# Patient Record
Sex: FEMALE | Race: WHITE | NOT HISPANIC OR LATINO | Employment: FULL TIME | URBAN - METROPOLITAN AREA
[De-identification: names, ages, dates, MRNs, and addresses within clinical notes are randomized per-mention and may not be internally consistent; named-entity substitution may affect disease eponyms.]

---

## 2017-05-17 ENCOUNTER — TELEPHONE (OUTPATIENT)
Dept: FAMILY MEDICINE | Facility: CLINIC | Age: 42
End: 2017-05-17

## 2017-05-17 DIAGNOSIS — Z80.8 FAMILY HISTORY OF THYROID CANCER: ICD-10-CM

## 2017-05-17 DIAGNOSIS — Z13.6 CARDIOVASCULAR SCREENING; LDL GOAL LESS THAN 160: Primary | ICD-10-CM

## 2017-05-17 DIAGNOSIS — F41.9 ANXIETY: ICD-10-CM

## 2017-05-17 DIAGNOSIS — F32.A MINOR DEPRESSION: ICD-10-CM

## 2017-05-17 DIAGNOSIS — Z00.01 ENCOUNTER FOR ROUTINE ADULT HEALTH EXAMINATION WITH ABNORMAL FINDINGS: ICD-10-CM

## 2017-05-19 DIAGNOSIS — Z13.6 CARDIOVASCULAR SCREENING; LDL GOAL LESS THAN 160: ICD-10-CM

## 2017-05-19 DIAGNOSIS — F32.A MINOR DEPRESSION: ICD-10-CM

## 2017-05-19 DIAGNOSIS — F41.9 ANXIETY: ICD-10-CM

## 2017-05-19 LAB
ERYTHROCYTE [DISTWIDTH] IN BLOOD BY AUTOMATED COUNT: 14.5 % (ref 10–15)
HCT VFR BLD AUTO: 41.2 % (ref 35–47)
HGB BLD-MCNC: 13.7 G/DL (ref 11.7–15.7)
MCH RBC QN AUTO: 27.7 PG (ref 26.5–33)
MCHC RBC AUTO-ENTMCNC: 33.3 G/DL (ref 31.5–36.5)
MCV RBC AUTO: 83 FL (ref 78–100)
PLATELET # BLD AUTO: 232 10E9/L (ref 150–450)
RBC # BLD AUTO: 4.94 10E12/L (ref 3.8–5.2)
WBC # BLD AUTO: 4.9 10E9/L (ref 4–11)

## 2017-05-19 PROCEDURE — 36415 COLL VENOUS BLD VENIPUNCTURE: CPT | Performed by: FAMILY MEDICINE

## 2017-05-19 PROCEDURE — 85027 COMPLETE CBC AUTOMATED: CPT | Performed by: FAMILY MEDICINE

## 2017-05-19 PROCEDURE — 84443 ASSAY THYROID STIM HORMONE: CPT | Performed by: FAMILY MEDICINE

## 2017-05-19 PROCEDURE — 80061 LIPID PANEL: CPT | Performed by: FAMILY MEDICINE

## 2017-05-19 PROCEDURE — 80053 COMPREHEN METABOLIC PANEL: CPT | Performed by: FAMILY MEDICINE

## 2017-05-20 LAB
ALBUMIN SERPL-MCNC: 4.4 G/DL (ref 3.4–5)
ALP SERPL-CCNC: 70 U/L (ref 40–150)
ALT SERPL W P-5'-P-CCNC: 21 U/L (ref 0–50)
ANION GAP SERPL CALCULATED.3IONS-SCNC: 8 MMOL/L (ref 3–14)
AST SERPL W P-5'-P-CCNC: 15 U/L (ref 0–45)
BILIRUB SERPL-MCNC: 0.4 MG/DL (ref 0.2–1.3)
BUN SERPL-MCNC: 14 MG/DL (ref 7–30)
CALCIUM SERPL-MCNC: 9 MG/DL (ref 8.5–10.1)
CHLORIDE SERPL-SCNC: 108 MMOL/L (ref 94–109)
CHOLEST SERPL-MCNC: 165 MG/DL
CO2 SERPL-SCNC: 24 MMOL/L (ref 20–32)
CREAT SERPL-MCNC: 0.9 MG/DL (ref 0.52–1.04)
GFR SERPL CREATININE-BSD FRML MDRD: 69 ML/MIN/1.7M2
GLUCOSE SERPL-MCNC: 83 MG/DL (ref 70–99)
HDLC SERPL-MCNC: 82 MG/DL
LDLC SERPL CALC-MCNC: 67 MG/DL
NONHDLC SERPL-MCNC: 83 MG/DL
POTASSIUM SERPL-SCNC: 4.1 MMOL/L (ref 3.4–5.3)
PROT SERPL-MCNC: 7.6 G/DL (ref 6.8–8.8)
SODIUM SERPL-SCNC: 140 MMOL/L (ref 133–144)
TRIGL SERPL-MCNC: 79 MG/DL
TSH SERPL DL<=0.005 MIU/L-ACNC: 1.88 MU/L (ref 0.4–4)

## 2017-06-23 ENCOUNTER — OFFICE VISIT (OUTPATIENT)
Dept: FAMILY MEDICINE | Facility: CLINIC | Age: 42
End: 2017-06-23
Payer: COMMERCIAL

## 2017-06-23 VITALS
DIASTOLIC BLOOD PRESSURE: 72 MMHG | WEIGHT: 129.6 LBS | TEMPERATURE: 97.9 F | OXYGEN SATURATION: 98 % | HEART RATE: 68 BPM | HEIGHT: 63 IN | BODY MASS INDEX: 22.96 KG/M2 | SYSTOLIC BLOOD PRESSURE: 122 MMHG

## 2017-06-23 DIAGNOSIS — F41.9 ANXIETY: ICD-10-CM

## 2017-06-23 DIAGNOSIS — Z00.01 ENCOUNTER FOR ROUTINE ADULT MEDICAL EXAM WITH ABNORMAL FINDINGS: Primary | ICD-10-CM

## 2017-06-23 DIAGNOSIS — F32.A MINOR DEPRESSION: ICD-10-CM

## 2017-06-23 DIAGNOSIS — F42.4 HABITUAL SELF-EXCORIATION: ICD-10-CM

## 2017-06-23 DIAGNOSIS — S54.02XA INJURY OF LEFT ULNAR NERVE, UNSPECIFIED INJURY LOCATION, INITIAL ENCOUNTER: ICD-10-CM

## 2017-06-23 DIAGNOSIS — M67.40 GANGLION CYST: ICD-10-CM

## 2017-06-23 PROCEDURE — 99396 PREV VISIT EST AGE 40-64: CPT | Performed by: FAMILY MEDICINE

## 2017-06-23 PROCEDURE — 99213 OFFICE O/P EST LOW 20 MIN: CPT | Mod: 25 | Performed by: FAMILY MEDICINE

## 2017-06-23 ASSESSMENT — ANXIETY QUESTIONNAIRES
6. BECOMING EASILY ANNOYED OR IRRITABLE: NOT AT ALL
5. BEING SO RESTLESS THAT IT IS HARD TO SIT STILL: NOT AT ALL
7. FEELING AFRAID AS IF SOMETHING AWFUL MIGHT HAPPEN: NOT AT ALL
1. FEELING NERVOUS, ANXIOUS, OR ON EDGE: NOT AT ALL
3. WORRYING TOO MUCH ABOUT DIFFERENT THINGS: NOT AT ALL
2. NOT BEING ABLE TO STOP OR CONTROL WORRYING: NOT AT ALL
GAD7 TOTAL SCORE: 0

## 2017-06-23 ASSESSMENT — PATIENT HEALTH QUESTIONNAIRE - PHQ9: 5. POOR APPETITE OR OVEREATING: NOT AT ALL

## 2017-06-23 NOTE — NURSING NOTE
"Chief Complaint   Patient presents with     Physical       Initial /72 (BP Location: Left arm, Patient Position: Chair, Cuff Size: Adult Regular)  Pulse 68  Temp 97.9  F (36.6  C) (Oral)  Ht 5' 3.25\" (1.607 m)  Wt 129 lb 9.6 oz (58.8 kg)  LMP 06/19/2017 (Exact Date)  SpO2 98%  BMI 22.78 kg/m2 Estimated body mass index is 22.78 kg/(m^2) as calculated from the following:    Height as of this encounter: 5' 3.25\" (1.607 m).    Weight as of this encounter: 129 lb 9.6 oz (58.8 kg).  Medication Reconciliation: complete   Nicolette Drake CMA  "

## 2017-06-23 NOTE — LETTER
[address] [address]  Phone: [phone]  Fax: [phone]  June 23, 2017     AUTHORIZATION TO RELEASE PROTECTED HEALTH INFORMATION    Patient Name:  Kira Gomez  YOB: 1975    Deepa MRN:[id number]             This will authorize [facility]  to request information from :     [facility] - Dr. Rossy Griffin     The following information is to be released for health maintenance and continuing care purposes with my primary care clinic:                 Mammogram                                         When: future - pt has upcoming appt     Pap Smear Report(most recent only)       When: future - pt has upcoming appt     -I understand that I may revoke this authorization by written request at any time to the address listed at the top of this form.  I understand that the revocation will not apply to information that has already been released in response to this authorization.    -This authorization last for one year after the date you sign it.     -[facility] cannot prevent redisclosure of the information by the person or organization who receives your records under this authorization, and that information may not be covered by state and federal privacy protections after it is released. By signing this authorization, you release [facility] from any and all liability resulting from a redisclosure by the recipient.    ___________________________________          _____________  Signature of Patient/Authorized Person                     Date        ____________________________________________  (Reason if patient is unable to sign)

## 2017-06-23 NOTE — PROGRESS NOTES
SUBJECTIVE:     CC: Kira Gomez is an 42 year old woman who presents for preventive health visit.     Answers for HPI/ROS submitted by the patient on 6/23/2017   Annual Exam:  Getting at least 3 servings of Calcium per day:: Yes  Bi-annual eye exam:: Yes  Dental care twice a year:: Yes  Sleep apnea or symptoms of sleep apnea:: None  Diet:: Regular (no restrictions)  Frequency of exercise:: 2-3 days/week  Taking medications regularly:: Yes  Medication side effects:: Not applicable  Additional concerns today:: YES  PHQ-2 Score: 0  Duration of exercise:: 45-60 minutes.       Depression and Anxiety Follow-Up    Status since last visit: No change    Other associated symptoms:None    Complicating factors:     Significant life event: No     Current substance abuse: None    PHQ-9 SCORE 12/18/2013 2/10/2016 6/23/2017   Total Score 21 - -   Total Score - 2 0     CAREN-7 SCORE 12/18/2013 2/10/2016 6/23/2017   Total Score 16 - -   Total Score - 14 0       PHQ-9  English  PHQ-9   Any Language  GAD7    Today's PHQ-2 Score:   PHQ-2 ( 1999 [employer]) 6/23/2017 2/10/2016   Q1: Little interest or pleasure in doing things 0 0   Q2: Feeling down, depressed or hopeless 0 0   PHQ-2 Score 0 0   Q1: Little interest or pleasure in doing things Not at all Not at all   Q2: Feeling down, depressed or hopeless Not at all Not at all   PHQ-2 Score 0 0       Abuse: Current or Past(Physical, Sexual or Emotional)- No  Do you feel safe in your environment - Yes    Social History   Substance Use Topics     Smoking status: Never Smoker     Smokeless tobacco: Never Used     Alcohol use 0.0 oz/week      Comment: occasional     The patient does not drink >3 drinks per day nor >7 drinks per week.    Recent Labs   Lab Test  05/19/17   1430 05/24/11   CHOL  165  157   HDL  82  67.4   LDL  67  67.8   TRIG  79  109   CHOLHDLRATIO   --   2.329   NHDL  83   --        Reviewed orders with patient.  Reviewed health maintenance and updated orders accordingly -  Yes    Mammo Decision Support:  Patient under age 50, mutual decision reflected in health maintenance.      Pertinent mammograms are reviewed under the imaging tab.  History of abnormal Pap smear: NO - age 30- 65 PAP every 3 years recommended    Reviewed and updated as needed this visit by clinical staff  Tobacco  Allergies  Meds  Problems  Med Hx  Surg Hx  Fam Hx  Soc Hx          Reviewed and updated as needed this visit by Provider  Allergies  Meds  Problems        Health Maintenance   Topic Date Due     MAMMO Q1 YR  2017     INFLUENZA VACCINE (SYSTEM ASSIGNED)  2017     PAP Q3 YR  2019     TETANUS IMMUNIZATION (SYSTEM ASSIGNED)  10/27/2021     LIPID MONITORING Q5 YEARS  2022       Patient Active Problem List   Diagnosis     CARDIOVASCULAR SCREENING; LDL GOAL LESS THAN 160     Other insomnia- has been off ambien for several years - noted 2/10/2016     Minor depression     Anxiety     Encounter for gynecological examination without abnormal finding- [facility]     Family history of thyroid cancer- father at age 67     Family history of bicuspid aortic valve- brother     OCD (obsessive compulsive disorder)- skin picking     Ganglion cyst     Injury of left ulnar nerve, unspecified injury location, initial encounter       Past Medical History:   Diagnosis Date     Family history of thyroid cancer- father at age 67      Migraines        Past Surgical History:   Procedure Laterality Date     ABDOMEN SURGERY  12/3/2007         C  DELIVERY+POSTPARTUM CARE         Social History     Social History     Marital status:      Spouse name: N/A     Number of children: N/A     Years of education: N/A     Occupational History     Not on file.     Social History Main Topics     Smoking status: Never Smoker     Smokeless tobacco: Never Used     Alcohol use 0.0 oz/week      Comment: occasional     Drug use: No     Sexual activity: Yes     Partners: Male     Birth control/  protection: Condom      Comment:  thinking about vasectomy      Other Topics Concern     Parent/Sibling W/ Cabg, Mi Or Angioplasty Before 65f 55m? No     Social History Narrative       Family History   Problem Relation Age of Onset     Prostate Cancer Maternal Grandfather      Eye Disorder Paternal Grandmother      Thyroid Disease Father        ROS: wondering if she has carpal tunnel syndrome - recently has noticed a little cyst on the dorsum of her left wrist - was scooping repetitively with left hand at [employer] for 2 hours , 2 months ago and since then the tip of her middle finger feels almost totally numb and gets intermittent numbness of the tips of her ring and pinkie fingers on the left as well.   Hx of what sounds like a ganglion cyst volar left wrist - bangs it with a heavy book every once in a while and it goes away - gone currently.     [facility] and annual gyn exams and gets her mammograms at [facility] in [place].     C: NEGATIVE for fever, chills, change in weight  I: NEGATIVE for worrisome rashes, moles or lesions  E: NEGATIVE for vision changes or irritation  ENT: NEGATIVE for ear, mouth and throat problems  R: NEGATIVE for significant cough or SOB  B: NEGATIVE for masses, tenderness or discharge  CV: NEGATIVE for chest pain, palpitations or peripheral edema  GI: NEGATIVE for nausea, abdominal pain, heartburn, or change in bowel habits  : NEGATIVE for unusual urinary or vaginal symptoms. Periods are regular.  M: NEGATIVE for significant arthralgias or myalgia  N: NEGATIVE for weakness, dizziness or paresthesias  P: NEGATIVE for changes in mood or affect    Problem list, Medication list, Allergies, and Medical/Social/Surgical histories reviewed in [facility] and updated as appropriate.  Labs reviewed in EPIC  BP Readings from Last 3 Encounters:   06/23/17 122/72   02/10/16 122/66   04/04/14 112/56    Wt Readings from Last 3 Encounters:   06/23/17 129 lb 9.6  "oz (58.8 kg)   02/10/16 128 lb (58.1 kg)   04/04/14 119 lb (54 kg)                  OBJECTIVE:     /72 (BP Location: Left arm, Patient Position: Chair, Cuff Size: Adult Regular)  Pulse 68  Temp 97.9  F (36.6  C) (Oral)  Ht 5' 3.25\" (1.607 m)  Wt 129 lb 9.6 oz (58.8 kg)  LMP 06/19/2017 (Exact Date)  SpO2 98%  BMI 22.78 kg/m2  EXAM:  GENERAL: healthy, alert and no distress.   EYES: Eyes grossly normal to inspection, PERRL and conjunctivae and sclerae normal  HENT: ear canals and TM's normal, nose and mouth without ulcers or lesions  NECK: no adenopathy, no asymmetry, masses, or scars and thyroid normal to palpation  RESP: lungs clear to auscultation - no rales, rhonchi or wheezes  CV: regular rate and rhythm, normal S1 S2, no S3 or S4, no murmur, click or rub, no peripheral edema and peripheral pulses strong.   ABDOMEN: soft, nontender, no hepatosplenomegaly, no masses and bowel sounds normal  MS: no gross musculoskeletal defects noted, no edema  SKIN: no suspicious lesions or rashes  NEURO: Normal strength and tone, mentation intact and speech normal- Cranial nerves 2-12 are fully intact.     Muscle strength is 5/5 at the biceps, triceps, brachioradialis,  strength and finger spread as well as hip flexors and extendors, quadriceps, hamstrings, foot dorsi- and plantar flexion as well as extensor hallucis longus bilaterally.   Reflexes are brisk and symmetric at the biceps, triceps, brachioradialis, patellar, and Achilles tendons bilaterally.  Toes are down going bilaterally.   Gait is normal.   PSYCH: mentation appears normal, affect normal/bright.   Tinel's sign - negative and Phalen's sign negative as well.    no ganglion cyst noted today.      Pt had her lab work done previously: discussed and released results to pt previously.   TSH   Date Value Ref Range Status   05/19/2017 1.88 0.40 - 4.00 mU/L Final     CBC RESULTS:   Recent Labs   Lab Test  05/19/17   1430   WBC  4.9   RBC  4.94   HGB  13.7 " "  HCT  41.2   MCV  83   MCH  27.7   MCHC  33.3   RDW  14.5   PLT  232     Last Basic Metabolic Panel:  Lab Results   Component Value Date     05/19/2017      Lab Results   Component Value Date    POTASSIUM 4.1 05/19/2017     Lab Results   Component Value Date    CHLORIDE 108 05/19/2017     Lab Results   Component Value Date    LISA 9.0 05/19/2017     Lab Results   Component Value Date    CO2 24 05/19/2017     Lab Results   Component Value Date    BUN 14 05/19/2017     Lab Results   Component Value Date    CR 0.90 05/19/2017     Lab Results   Component Value Date    GLC 83 05/19/2017     GFR Estimate   Date Value Ref Range Status   05/19/2017 69 >60 mL/min/1.7m2 Final     Comment:     Non  GFR Calc   12/08/2010 59 (L) >60 mL/min/1.7m2 Final   10/08/2008 78 >60 mL/min/1.7m2 Final     Recent Labs   Lab Test  05/19/17   1430 05/24/11   CHOL  165  157   HDL  82  67.4   LDL  67  67.8   TRIG  79  109   CHOLHDLRATIO   --   2.329            ASSESSMENT/PLAN:         ICD-10-CM    1. Encounter for routine adult medical exam with abnormal findings Z00.01    2. Injury of left ulnar nerve, unspecified injury location, initial encounter S54.02XA ORTHO  REFERRAL     OFFICE/OUTPT VISIT,EST,LEVL III   3. Ganglion cyst M67.40 OFFICE/OUTPT VISIT,EST,LEVL III   4. Habitual self-excoriation- under control currently  F42.4    5. Anxiety F41.9    6. Minor depression- in remission currently.  F32.9        COUNSELING:   Reviewed preventive health counseling, as reflected in patient instructions     reports that she has never smoked. She has never used smokeless tobacco.    Estimated body mass index is 22.78 kg/(m^2) as calculated from the following:    Height as of this encounter: 5' 3.25\" (1.607 m).    Weight as of this encounter: 129 lb 9.6 oz (58.8 kg).        Offered pt EMG and she'd like to try conservative therapy first.  Ok to order EMG , if pt changes her mind.     Spent  17 minutes on pt care today " outside of preventative care. All face to face time from 3:50 to 4:07.  Greater than 50% of time spent in coordination of care/counseling today re:  Injury of left ulnar nerve, unspecified injury location, initial encounter    Ganglion cyst - history of - recurrent - advised pt not to bang it with a book.     anxiety and depression - well controlled currently.     Counseling Resources:  ATP IV Guidelines  Pooled Cohorts Equation Calculator  Breast Cancer Risk Calculator  FRAX Risk Assessment  ICSI Preventive Guidelines  Dietary Guidelines for Americans, 2010  USDA's MyPlate  ASA Prophylaxis  Lung CA Screening    Mitzi Velasquez MD  [facility]

## 2017-06-23 NOTE — MR AVS SNAPSHOT
After Visit Summary   6/23/2017    Kira Gomez    MRN: [id number]           Patient Information     Date Of Birth          1975        Visit Information        Provider Department      6/23/2017 3:15 PM Mitzi Velasquez MD [facility] [place]        Today's Diagnoses     Encounter for routine adult medical exam with abnormal findings    -  1    Injury of left ulnar nerve, unspecified injury location, initial encounter        Ganglion cyst          Care Instructions    Try Ibuprofen 400-600mg (2-3 otc tablets) with food every 4-6 hours if needed for the numbness in your left hand to control inflammation. Try otc wrist brace and/or sling to keep arm and wrist stable at night.      Consider EMG testing to determine true cause  Of your numbness.        Preventive Health Recommendations  Female Ages 40 to 49    Yearly exam:     See your health care provider every year in order to  1. Review health changes.   2. Discuss preventive care.    3. Review your medicines if your doctor prescribed any.      Get a Pap test every three years (unless you have an abnormal result and your provider advises testing more often).      If you get Pap tests with HPV test, you only need to test every 5 years, unless you have an abnormal result. You do not need a Pap test if your uterus was removed (hysterectomy) and you have not had cancer.      You should be tested each year for STDs (sexually transmitted diseases), if you're at risk.       Ask your doctor if you should have a mammogram.      Have a colonoscopy (test for colon cancer) if someone in your family has had colon cancer or polyps before age 50.       Have a cholesterol test every 5 years.       Have a diabetes test (fasting glucose) after age 45. If you are at risk for diabetes, you should have this test every 3 years.    Shots: Get a flu shot each year. Get a tetanus shot every 10 years.     Nutrition:     Eat at least 5 servings of fruits  and vegetables each day.    Eat whole-grain bread, whole-wheat pasta and brown rice instead of white grains and rice.    Talk to your provider about Calcium and Vitamin D.     Lifestyle    Exercise at least 150 minutes a week (an average of 30 minutes a day, 5 days a week). This will help you control your weight and prevent disease.    Limit alcohol to one drink per day.    No smoking.     Wear sunscreen to prevent skin cancer.    See your dentist every six months for an exam and cleaning.                        Carpal Tunnel Syndrome               What is carpal tunnel syndrome?   Carpal tunnel syndrome is a common, painful disorder of the wrist and hand.   How does it occur?   Carpal tunnel syndrome is caused by pressure on the median nerve in your wrist. People who use their hands and wrists in the same motion over and over tend to develop carpal tunnel syndrome. It is common in cashiers, , assembly-line workers, and people who work on the computer.   Swelling from a broken bone or other injury can cause pressure on the nerve as well. Diseases such as arthritis, diabetes, or hypothyroidism can cause swelling and pressure in the wrist. Sometimes it happens during pregnancy.   What are the symptoms?   The symptoms include:   pain, numbness, or tingling in your hand and wrist, especially in the thumb and index and middle fingers; pain may radiate up into the forearm   increased pain with increased use of your hand, such as when you are driving or reading the newspaper   increased pain at night   weak  and tendency to drop objects held in the hand   sensitivity to cold   muscle deterioration especially in the thumb (in later stages)   How is it diagnosed?   Your healthcare provider will review your symptoms, examine you, and discuss the ways you use your hands. He or she may also do the following tests:   Your provider may tap the inside middle of your wrist over the median nerve. You may feel pain or a  sensation like an electric shock.   You may be asked to bend your wrist down for one minute to see if this causes symptoms.   Your provider may arrange to test the response of your nerves and muscles to electrical stimulation.   How is it treated?   If you have a disease that is causing carpal tunnel syndrome (such as rheumatoid arthritis), treating the disease may relieve your symptoms.   Other treatment focuses on relieving irritation and pressure on the nerve in your wrist. To relieve pressure your healthcare provider may suggest:   restricting use of your hand or changing the way you use it   changing the position of your desk, computer, and chair to one that irritates your wrist less   wearing a wrist splint   exercises   Your provider may prescribe a steroid medicine or a nonsteroidal anti-inflammatory medicine, such as ibuprofen. Nonsteroidal anti-inflammatory medicines (NSAIDs) may cause stomach bleeding and other problems. These risks increase with age. Read the label and take as directed. Unless recommended by your healthcare provider, do not take for more than 10 days.   Your provider may give you an injection of a corticosteroid medicine.   In some cases surgery may be necessary.   How long will the effects last?   How long the symptoms of carpal tunnel syndrome last depends on the cause and your response to treatment. Sometimes the symptoms go away without any treatment, or they may be relieved by nonsurgical treatment. Surgery may be needed to relieve the symptoms if they do not respond to treatment or they get worse. Surgery usually relieves the symptoms, especially if there is no permanent damage to the nerve.   Symptoms of carpal tunnel syndrome that occur during pregnancy usually disappear following delivery.   How can I take care of myself?   Follow your healthcare provider's recommendations. Also try the following:   Raise your arm on a pillow when you sit or lie down.   Avoid activities that  overuse your hand.   When you use a computer mouse, use it with the hand that does not have carpal tunnel syndrome.   Find a different way to use your hand by using another tool or try to use the other hand.   Avoid bending your wrists.   When can I return to my normal activities?   Everyone recovers from an injury at a different rate. Return to your activities depends on how soon your wrist recovers, not by how many days or weeks it has been since your injury has occurred. In general, the longer you have symptoms before you start treatment, the longer it will take to get better. The goal is to return to your normal activities as soon as is safely possible. If you return too soon you may worsen your injury.   You may return to your activities when you are able to painlessly  objects and have full range of motion and strength back in your wrist.   What can I do to help prevent carpal tunnel syndrome?   Make sure that your hands and wrists are supported, especially if you do repetitive work. Take regular breaks from the repetitive motion. Do not rest your wrists on hard or ridged surfaces for long periods of time.   In some cases the cause is not known and carpal tunnel syndrome cannot be prevented.            Carpal Tunnel Rehabilitation Exercise            You may do all of these exercises right away.   Wrist Range of Motion   0. Flexion: Gently bend your wrist forward. Hold for 5 seconds. Do 3 sets of 10.   0. Extension: Gently bend your wrist backward. Hold this position 5 seconds. Do 3 sets of 10.   0. Side to side: Gently move your wrist from side to side (a handshake motion). Hold for 5 seconds in each direction. Do 3 sets of 10.   Wrist stretch: Press the back of the hand on your injured side with your other hand to help bend your wrist. Hold for 15 to 30 seconds. Next, stretch the hand back by pressing the fingers in a backward direction. Hold for 15 to 30 seconds. Keep the arm on your injured side  straight during this exercise. Do 3 sets.   Mid-trap exercise: Lie on your stomach on a firm surface and place a folded pillow underneath your chest. Place your arms out straight to your sides with your elbows straight and thumbs toward the ceiling. Slowly raise your arms toward the ceiling as you squeeze your shoulder blades together. Lower slowly. Do 3 sets of 15. As the exercise gets easier to do, hold soup cans or small weights in your hands.   Pectoralis stretch:  a doorway or corner with both hands slightly above your head on the door frame or wall. Slowly lean forward until you feel a stretch in the front of your shoulders. Hold 15 to 30 seconds. Repeat 3 times.   Scalene stretch: Sit or stand and clasp both hands behind your back. Lower your left shoulder and tilt your head toward the right until you feel a stretch. Hold this position for 15 to 30 seconds and then come back to the starting position. Then lower your right shoulder and tilt your head toward the left. Hold for 15 to 30 seconds. Repeat 3 times on each side.   Thoracic extension: While sitting in a chair, clasp both arms behind your head. Gently arch backward and look up toward the ceiling. Repeat 10 times. Do this several times each day.   Scapular squeeze: While sitting or standing with your arms by your sides, squeeze your shoulder blades together and hold for 5 seconds. Do 3 sets of 10.   Wrist extension: Hold a soup can or hammer handle in your hand with your palm facing down. Slowly bend your wrist up. Slowly lower the weight down into the starting position. Do 3 sets of 10. Gradually increase the weight of the object you are holding.    strengthening: Squeeze a soft rubber ball and hold the squeeze for 5 seconds. Do 3 sets of 10.            Published by [employer].  This content is reviewed periodically and is subject to change as new health information becomes available. The information is intended to inform and educate and  is not a replacement for medical evaluation, advice, diagnosis or treatment by a healthcare professional.   Written by Rachelle Wallace, MS, PT, and Maggi Bojorquez PT, [facility], [facility], for [employer].   ? 2010 [employer] and/or its affiliates. All Rights Reserved.   Copyright   Clinical Reference Systems 2011    What Is Cubital Tunnel Syndrome?  Cubital tunnel syndrome is a set of symptoms that may occur if the ulnar nerve in your elbow gets pinched. This may happen if you bend or lean on your elbows often.    Your cubital tunnel  The cubital tunnel is a groove in a bone near your elbow. This narrow groove provides a passage for the ulnar nerve, one of the main nerves in your arm. The ulnar nerve can cause  funny bone  pain if your elbow gets bumped. Your cubital tunnel helps protect this nerve as it passes through your elbow and down to your fingers.  Compressing the ulnar nerve  Bending your elbow compresses the ulnar nerve inside the cubital tunnel. The nerve can get inflamed (irritated) after constant bending and pinching or after getting hurt. Over time, this can lead to pain or numbness. The pain is often felt in your ring fingers and little fingers.     Bending your elbow as you hold a phone can cause problems over time.    What are its symptoms?    Numbness or tingling in the ring fingers and little fingers    Loss of finger or hand strength    Inability to straighten fingers    Sharp, sudden pain when elbow is touched  The road to healing  You can keep cubital tunnel syndrome from flaring up. Avoid pinching the ulnar nerve by keeping your arm straight as much as you can, even while sleeping. And use phone headsets and elbow pads. If you still have pain, tell your doctor.   Date Last Reviewed: 9/8/2015 2000-2017 The [employer]. [address] [place] [address], [place] [address]. All rights reserved. This information is not intended as a substitute for professional medical care. Always follow your healthcare  "professional's instructions.        Ulnar Nerve Palsy  The ulnar nerve travels down the arm from the shoulder to the hand. It controls movement and feeling in the wrist and hand. Damage to this nerve can cause a condition called ulnar nerve palsy.  A common cause of ulnar nerve palsy is leaning on the elbow for long periods of time. Injury to the arm or elbow, such as a fracture, can also damage the ulnar nerve.  Symptoms of ulnar nerve palsy include:    Numbness, tingling, or burning (often described as \"pins and needles\")    Pain    Weakness in the hand and fingers  The treatment depends on the cause of the nerve damage. In some cases, the problem will go away on its once pressure to the nerve is relieved. Splinting the wrist to limit movement may help with healing. If the problem is due to trauma or injury, physical therapy may be prescribed. Corticosteroids injections into the area may reduce swelling and pressure on the nerve. Surgery to repair the nerve may be needed for chronic symptoms that do not respond to simpler treatments.  Home care    Rest the wrist and arm until normal feeling and strength return.    If you were given a splint or sling, wear it as directed.    Avoid positions leaning on your elbows.    If medicines were prescribed for pain or nerve sensations, take them as directed.  Follow-up care  Follow up with your healthcare provider or as advised by our staff.  When to seek medical advice  Call your healthcare provider right away if you have any of the following:    Increasing arm swelling or pain    Numbness or weakness of the arm    Symptoms spread to other parts of the body    Slurred speech, confusion    Trouble speaking, walking, or seeing  Date Last Reviewed: 9/25/2015 2000-2017 [employer]. [address] [place] [address]. All rights reserved. This information is not intended as a substitute for professional medical care. Always follow your healthcare " professional's instructions.                   Ganglion Cyst          What is a ganglion cyst?   A ganglion cyst is a swollen, closed sac under the skin. The sac is attached to the sheath of a tendon or may be attached to a joint. The cyst contains fluid similar to the fluid that is in your joints. It can vary in size from a small pea to a golf ball. Ganglion cysts most often occur on the wrist, at the end joint of a finger, or at the base of a finger. They may also occur on the foot.   How does it occur?   The cause of ganglion cysts is not known.   What are the symptoms?   You may feel discomfort or pain. Sometimes the area of the cyst becomes swollen or disfigured.   How is it diagnosed?   If there is any question about the diagnosis, your healthcare provider may stick a needle into the cyst to take a sample of the fluid inside it. The fluid can be tested for other problems, such as infection.   How is it treated?   Unless a cyst hurts, it does not need to be treated. If it does hurt, put an ice pack, gel pack, or package of frozen vegetables, wrapped in a cloth, on the area for up to 20 minutes at a time every 3 to 4 hours, or at least once daily, until it becomes less painful. Taking an anti-inflammatory drug, such as aspirin or ibuprofen, may also help. Check with your healthcare provider before you give any medicine that contains aspirin or salicylates to a child or teen. This includes medicines like baby aspirin, some cold medicines, and Pepto Bismol. Children and teens who take aspirin are at risk for a serious illness called Reye's syndrome. Nonsteroidal anti-inflammatory medicines (NSAIDs) may cause stomach bleeding and other problems. These risks increase with age. Read the label and take as directed. Unless recommended by your healthcare provider, do not take for more than 10 days for any reason.   The fluid can be removed with a needle, but the cysts tend to fill up again with fluid.   Do not try to  smash the cyst with a heavy object. Even if this home remedy succeeds at first, the cyst will almost always fill up again with fluid. In addition, you could seriously damage your wrist or finger.   If a ganglion cyst is painful, limits activity, or is unsightly, it can be surgically removed. Surgery to remove the cyst requires making a small cut through the skin. The cut usually heals quickly and leaves a small scar.   How long will the effects last?   Sometimes cysts eventually go away whether they are treated or not. If your cyst is painful or interferes with your activities, you may need to have surgery. Even with surgical treatment, a cyst may come back.   How can I take care of myself?   Follow the treatment recommended by your healthcare provider.   How can I help prevent ganglion cysts?   There is no known way to prevent these cysts because their cause is not known.     Published by [employer].  This content is reviewed periodically and is subject to change as new health information becomes available. The information is intended to inform and educate and is not a replacement for medical evaluation, advice, diagnosis or treatment by a healthcare professional.   Developed by [employer].   ? 2010 [employer] and/or its affiliates. All Rights Reserved.   Copyright   Clinical Reference Systems 2011                      Follow-ups after your visit        Additional Services     ORTHO  REFERRAL       [facility] is referring you to the Orthopedic  Services at [facility].       The  Representative will assist you in the coordination of your Orthopedic and Musculoskeletal Care as prescribed by your physician.    The  Representative will call you within 1 business day to help schedule your appointment, or you may contact the  Representative at:    All areas ~ [phone]     Type of Referral : Non Surgical       Timeframe requested:  "Within 2 weeks    Coverage of these services is subject to the terms and limitations of your health insurance plan.  Please call member services at your health plan with any benefit or coverage questions.      If X-rays, CT or MRI's have been performed, please contact the facility where they were done to arrange for , prior to your scheduled appointment.  Please bring this referral request to your appointment and present it to your specialist.                  Who to contact     If you have questions or need follow up information about today's clinic visit or your schedule please contact [facility] directly at [phone].  Normal or non-critical lab and imaging results will be communicated to you by [email or web address], letter or phone within 4 business days after the clinic has received the results. If you do not hear from us within 7 days, please contact the clinic through [employer] or phone. If you have a critical or abnormal lab result, we will notify you by phone as soon as possible.  Submit refill requests through [email or web address] or call your pharmacy and they will forward the refill request to us. Please allow 3 business days for your refill to be completed.          Additional Information About Your Visit        [email or web address] Information     [email or web address] gives you secure access to your electronic health record. If you see a primary care provider, you can also send messages to your care team and make appointments. If you have questions, please call your primary care clinic.  If you do not have a primary care provider, please call [phone] and they will assist you.        Care EveryWhere ID     This is your Care EveryWhere ID. This could be used by other organizations to access your [facility] medical records  [id number]        Your Vitals Were     Pulse Temperature Height Last Period Pulse Oximetry BMI (Body Mass Index)    68 97.9  F (36.6  C) (Oral) 5' 3.25\" (1.607 m) 06/19/2017 (Exact Date) 98% 22.78 kg/m2    "    Blood Pressure from Last 3 Encounters:   06/23/17 122/72   02/10/16 122/66   04/04/14 112/56    Weight from Last 3 Encounters:   06/23/17 129 lb 9.6 oz (58.8 kg)   02/10/16 128 lb (58.1 kg)   04/04/14 119 lb (54 kg)              We Performed the Following     ORTHO  REFERRAL        Primary Care Provider Office Phone # Fax #    Mitzi Velasquez [phone]       [facility] [address] [facility] [address]        Equal Access to Services     [facility]: Hadii aad ku hadasho Soomaali, waaxda luqadaha, qaybta kaalmada adeegyada, mathew johnson . So [facility] [phone].    ATENCIÓN: Si habla español, tiene a reagan disposición servicios gratuitos de asistencia lingüística. [facility] [phone].    We comply with applicable federal civil rights laws and [place] laws. We do not discriminate on the basis of race, color, national origin, age, disability sex, sexual orientation or gender identity.            Thank you!     Thank you for choosing [facility]  for your care. Our goal is always to provide you with excellent care. Hearing back from our patients is one way we can continue to improve our services. Please take a few minutes to complete the written survey that you may receive in the mail after your visit with us. Thank you!             Your Updated Medication List - Protect others around you: Learn how to safely use, store and throw away your medicines at www.disposemymeds.org.          This list is accurate as of: 6/23/17  4:24 PM.  Always use your most recent med list.                   Brand Name Dispense Instructions for use Diagnosis    FIBER PO      Take by mouth every other day

## 2017-06-26 PROBLEM — F42.4 HABITUAL SELF-EXCORIATION: Status: ACTIVE | Noted: 2017-06-26

## 2017-06-27 ENCOUNTER — OFFICE VISIT (OUTPATIENT)
Dept: ORTHOPEDICS | Facility: CLINIC | Age: 42
End: 2017-06-27
Payer: COMMERCIAL

## 2017-06-27 VITALS
HEIGHT: 63 IN | BODY MASS INDEX: 22.86 KG/M2 | DIASTOLIC BLOOD PRESSURE: 78 MMHG | WEIGHT: 129 LBS | SYSTOLIC BLOOD PRESSURE: 120 MMHG

## 2017-06-27 DIAGNOSIS — G56.02 CARPAL TUNNEL SYNDROME OF LEFT WRIST: Primary | ICD-10-CM

## 2017-06-27 PROCEDURE — 99243 OFF/OP CNSLTJ NEW/EST LOW 30: CPT | Performed by: FAMILY MEDICINE

## 2017-06-27 ASSESSMENT — PATIENT HEALTH QUESTIONNAIRE - PHQ9: SUM OF ALL RESPONSES TO PHQ QUESTIONS 1-9: 0

## 2017-06-27 ASSESSMENT — ANXIETY QUESTIONNAIRES: GAD7 TOTAL SCORE: 0

## 2017-06-27 NOTE — PATIENT INSTRUCTIONS
Thank you for allowing us to participate in your care today.  Please find below your visit diagnosis and the plan going forward.    1. Carpal tunnel syndrome of left wrist      Discussed exam  Hand therapy: [facility] - [phone]    Follow up after 3-4 visits of therapy if not improving or sooner if needed. Call direct clinic number [[phone]] at any time with questions or concerns.    Rishi Robertson DO CAQSM  [facility]  Website: [email or web address]  Twitter: [email or web address]

## 2017-06-27 NOTE — Clinical Note
Kira Mitchell Dr. saw me at [facility] on Jun 27, 2017.  Please refer to the visit note at your convenience and feel free to contact me should you have any questions.  Thank you for the consult. Sincerely,  Rishi Robertson DO, [facility]

## 2017-06-27 NOTE — MR AVS SNAPSHOT
After Visit Summary   6/27/2017    Kira Gomez    MRN: [id number]           Patient Information     Date Of Birth          1975        Visit Information        Provider Department      6/27/2017 4:40 PM Rishi Robertson DO [facility]IC[facility]        Today's Diagnoses     Carpal tunnel syndrome of left wrist    -  1      Care Instructions    Thank you for allowing us to participate in your care today.  Please find below your visit diagnosis and the plan going forward.    1. Carpal tunnel syndrome of left wrist      Discussed exam  Hand therapy: [facility] - [phone]    Follow up after 3-4 visits of therapy if not improving or sooner if needed. Call direct clinic number [[phone]] at any time with questions or concerns.    Rishi Robertson DO [facility]  Website: [email or web address]  Twitter: [email or web address]            Follow-ups after your visit        Additional Services     [facility] PT, HAND, AND CHIROPRACTIC REFERRAL       **This order will print in the [facility] Scheduling Office**    Physical Therapy, Hand Therapy and Chiropractic Care are available through:    *[facility]  *[facility]  *[facility]    Call one number to schedule at any of the above locations: [phone].    Your provider has referred you to: Hand Therapy    Indication/Reason for Referral: Left Middle Finger Numbness (incomplete carpal tunnel)  Onset of Illness: see chart  Therapy Orders: Evaluate and Treat  Special Programs: None  Special Request: custom orthosis if indicated    Andrez Maguire      Additional Comments for the Therapist or Chiropractor:     Please be aware that coverage of these services is subject to the terms and limitations of your health insurance plan.  Call member services at your health plan with any benefit or coverage questions.      Please bring the following to your  "appointment:    *Your personal calendar for scheduling future appointments  *Comfortable clothing                  Who to contact     If you have questions or need follow up information about today's clinic visit or your schedule please contact [facility] SPORTS MEDICINE directly at [phone].  Normal or non-critical lab and imaging results will be communicated to you by Vividolabshart, letter or phone within 4 business days after the clinic has received the results. If you do not hear from us within 7 days, please contact the clinic through Vividolabshart or phone. If you have a critical or abnormal lab result, we will notify you by phone as soon as possible.  Submit refill requests through Startapp or call your pharmacy and they will forward the refill request to us. Please allow 3 business days for your refill to be completed.          Additional Information About Your Visit        Startapp Information     Startapp gives you secure access to your electronic health record. If you see a primary care provider, you can also send messages to your care team and make appointments. If you have questions, please call your primary care clinic.  If you do not have a primary care provider, please call [phone] and they will assist you.        Care EveryWhere ID     This is your Care EveryWhere ID. This could be used by other organizations to access your [place] medical records  [id number]        Your Vitals Were     Height Last Period BMI (Body Mass Index)             5' 3\" (1.6 m) 06/19/2017 (Exact Date) 22.85 kg/m2          Blood Pressure from Last 3 Encounters:   06/27/17 120/78   06/23/17 122/72   02/10/16 122/66    Weight from Last 3 Encounters:   06/27/17 129 lb (58.5 kg)   06/23/17 129 lb 9.6 oz (58.8 kg)   02/10/16 128 lb (58.1 kg)              We Performed the Following     [facility] PT, HAND, AND CHIROPRACTIC REFERRAL        Primary Care Provider Office Phone # Fax #    Mitzi Velasquez [phone] "       [facility] [address] [address]        Equal Access to Services     JORGESAMANTHA ADELINA : Hadii aad ku hadcrowmark Socriselda, waaxda luqadaha, qaybta kaalmada kenyattakavonandrés, waxay idiin hayadolfooriana benitezboubacarnorm morales. So [facility] [phone].    ATENCIÓN: Si habla español, tiene a reagan disposición servicios gratuitos de asistencia lingüística. Llame al [phone].    We comply with applicable federal civil rights laws and [place] laws. We do not discriminate on the basis of race, color, national origin, age, disability sex, sexual orientation or gender identity.            Thank you!     Thank you for choosing [facility] SP[facility]  for your care. Our goal is always to provide you with excellent care. Hearing back from our patients is one way we can continue to improve our services. Please take a few minutes to complete the written survey that you may receive in the mail after your visit with us. Thank you!             Your Updated Medication List - Protect others around you: Learn how to safely use, store and throw away your medicines at www.disposemymeds.org.          This list is accurate as of: 6/27/17  5:01 PM.  Always use your most recent med list.                   Brand Name Dispense Instructions for use Diagnosis    FIBER PO      Take by mouth every other day

## 2017-06-27 NOTE — PROGRESS NOTES
"ASSESSMENT & PLAN    ICD-10-CM    1. Carpal tunnel syndrome of left wrist G56.02 [facility], HAND, AND CHIROPRACTIC REFERRAL   Incomplete medial nerve neuropathy maybe related to transient flexor tenosynovitis/irritation  Hand therapy: [facility] - [phone]    Follow up after 3-4 visits of therapy if not improving or sooner if needed. Call direct clinic number [[phone]] at any time with questions or concerns. Instructed to call the office if the condition evolves or worsens.    -----    SUBJECTIVE  Kira Gomez is a/an 42 year old right hand dominant female who is seen in consultation at the request of Dr. Velasquez for evaluation of left distal middle finger numbness/pins and needles pain. The patient is seen by themselves.    Onset: 2 month(s) ago. Patient describes injury as possibly from serving food for over an hour. Had a tight  with repetitive pronation/supination movement. Remembers having difficulty / tightness when trying to release her .  Worsened by: unknown  Better with: unknown  Quality: pins and needles and numbness in middle finger  Pain Scale (maximum/current)/10: 0/10 / 0/10  Treatments tried: ibuprofen  Orthopedic history: has a history of ganglion cyst on dorsal wrist  Relevant surgical history: NO  Patient Social History: works at [employer]    Patient's past medical, surgical, social, and family histories were reviewed today and no changes are noted.    REVIEW OF SYSTEMS:  10 point ROS is negative other than symptoms noted above in HPI, Past Medical History or as stated below  Constitutional: NEGATIVE for fever, chills, change in weight  Skin: NEGATIVE for worrisome rashes, moles or lesions  GI/: NEGATIVE for bowel or bladder changes  Neuro: NEGATIVE for weakness, dizziness or paresthesias    OBJECTIVE:  /78  Ht 5' 3\" (1.6 m)  Wt 129 lb (58.5 kg)  LMP 06/19/2017 (Exact Date)  BMI 22.85 kg/m2   General: healthy, alert and in no distress  HEENT: no " scleral icterus or conjunctival erythema  Skin: no suspicious lesions or rash. No jaundice.  CV: regular rhythm by palpation  Resp: normal respiratory effort without conversational dyspnea   Psych: normal mood and affect  Gait: normal steady gait with appropriate coordination and balance  Neuro: decreased sensation over left middle finger, DIP and distal otherwise normal light touch sensory exam of the bilateral hands.     MSK:  LEFT HAND  Inspection:    No swelling or obvious deformity or asymmetry  Palpation:  Nontender over digits and carpal bones  Range of Motion:    Full in all planes  Strength:     full, Full with resisted flexor / extensor testing. Neither provokes/worsens numbness/tingling  Special Tests:    Negative: Tinel's, Phalen's    Independent visualization of the below image:  None indicated at this time    Patient's conditions were thoroughly discussed during today's visit with greater than 50% of the visit spent counseling the patient with total time spent face-to-face with the patient being 20 minutes.    Rishi Robertson DO [facility] Orthopedic Care

## 2017-07-01 ENCOUNTER — HEALTH MAINTENANCE LETTER (OUTPATIENT)
Age: 42
End: 2017-07-01

## 2017-07-10 ENCOUNTER — MYC MEDICAL ADVICE (OUTPATIENT)
Dept: FAMILY MEDICINE | Facility: CLINIC | Age: 42
End: 2017-07-10

## 2017-08-04 NOTE — TELEPHONE ENCOUNTER
JAYCE.    See mychart note. Do you want me to ask Priscilla or Izabella to follow up?    Dania Cadena, CATINA, RN, PHN  [facility]) [phone]

## 2017-08-04 NOTE — TELEPHONE ENCOUNTER
I reviewed pt's visit and we discussed her numbness with ulnar type nerve issues in detail  And I warned that she might be charged for an office visit as we did discuss things outside the scope of preventative care.   Absolutely Izabella can call the patient, but she was aware of the above possibility.     Routed to Izabella.  Triage, will you please ensure Izabella sees this? Thanks. ---KINSEY

## 2017-08-07 NOTE — TELEPHONE ENCOUNTER
Note printed and placed on Izabella's desk.     Ro Romero RN  [facility]

## 2017-08-29 ENCOUNTER — MYC MEDICAL ADVICE (OUTPATIENT)
Dept: FAMILY MEDICINE | Facility: CLINIC | Age: 42
End: 2017-08-29

## 2017-08-30 ENCOUNTER — MYC MEDICAL ADVICE (OUTPATIENT)
Dept: FAMILY MEDICINE | Facility: CLINIC | Age: 42
End: 2017-08-30

## 2018-07-07 ENCOUNTER — HEALTH MAINTENANCE LETTER (OUTPATIENT)
Age: 43
End: 2018-07-07

## 2018-11-14 ENCOUNTER — OFFICE VISIT (OUTPATIENT)
Dept: URGENT CARE | Facility: URGENT CARE | Age: 43
End: 2018-11-14
Payer: COMMERCIAL

## 2018-11-14 ENCOUNTER — RADIANT APPOINTMENT (OUTPATIENT)
Dept: GENERAL RADIOLOGY | Facility: CLINIC | Age: 43
End: 2018-11-14
Attending: FAMILY MEDICINE
Payer: COMMERCIAL

## 2018-11-14 VITALS
SYSTOLIC BLOOD PRESSURE: 138 MMHG | HEART RATE: 81 BPM | WEIGHT: 135.5 LBS | TEMPERATURE: 98.8 F | DIASTOLIC BLOOD PRESSURE: 82 MMHG | BODY MASS INDEX: 24 KG/M2 | OXYGEN SATURATION: 98 %

## 2018-11-14 DIAGNOSIS — R42 DIZZINESS: Primary | ICD-10-CM

## 2018-11-14 DIAGNOSIS — R42 DIZZINESS: ICD-10-CM

## 2018-11-14 LAB
ALBUMIN UR-MCNC: NEGATIVE MG/DL
APPEARANCE UR: CLEAR
BILIRUB UR QL STRIP: NEGATIVE
COLOR UR AUTO: YELLOW
GLUCOSE UR STRIP-MCNC: NEGATIVE MG/DL
HGB UR QL STRIP: NEGATIVE
KETONES UR STRIP-MCNC: NEGATIVE MG/DL
LEUKOCYTE ESTERASE UR QL STRIP: NEGATIVE
NITRATE UR QL: NEGATIVE
PH UR STRIP: 7 PH (ref 5–7)
SOURCE: NORMAL
SP GR UR STRIP: 1.02 (ref 1–1.03)
UROBILINOGEN UR STRIP-ACNC: 0.2 EU/DL (ref 0.2–1)

## 2018-11-14 PROCEDURE — 93000 ELECTROCARDIOGRAM COMPLETE: CPT | Performed by: FAMILY MEDICINE

## 2018-11-14 PROCEDURE — 36415 COLL VENOUS BLD VENIPUNCTURE: CPT | Performed by: FAMILY MEDICINE

## 2018-11-14 PROCEDURE — 80053 COMPREHEN METABOLIC PANEL: CPT | Performed by: FAMILY MEDICINE

## 2018-11-14 PROCEDURE — 81003 URINALYSIS AUTO W/O SCOPE: CPT | Performed by: FAMILY MEDICINE

## 2018-11-14 PROCEDURE — 71046 X-RAY EXAM CHEST 2 VIEWS: CPT

## 2018-11-14 PROCEDURE — 99214 OFFICE O/P EST MOD 30 MIN: CPT | Performed by: FAMILY MEDICINE

## 2018-11-15 LAB
ALBUMIN SERPL-MCNC: 4.2 G/DL (ref 3.4–5)
ALP SERPL-CCNC: 53 U/L (ref 40–150)
ALT SERPL W P-5'-P-CCNC: 19 U/L (ref 0–50)
ANION GAP SERPL CALCULATED.3IONS-SCNC: 7 MMOL/L (ref 3–14)
AST SERPL W P-5'-P-CCNC: 20 U/L (ref 0–45)
BILIRUB SERPL-MCNC: 0.3 MG/DL (ref 0.2–1.3)
BUN SERPL-MCNC: 18 MG/DL (ref 7–30)
CALCIUM SERPL-MCNC: 8.7 MG/DL (ref 8.5–10.1)
CHLORIDE SERPL-SCNC: 105 MMOL/L (ref 94–109)
CO2 SERPL-SCNC: 27 MMOL/L (ref 20–32)
CREAT SERPL-MCNC: 0.85 MG/DL (ref 0.52–1.04)
GFR SERPL CREATININE-BSD FRML MDRD: 73 ML/MIN/1.7M2
GLUCOSE SERPL-MCNC: 87 MG/DL (ref 70–99)
POTASSIUM SERPL-SCNC: 3.9 MMOL/L (ref 3.4–5.3)
PROT SERPL-MCNC: 7.2 G/DL (ref 6.8–8.8)
SODIUM SERPL-SCNC: 139 MMOL/L (ref 133–144)

## 2018-11-15 NOTE — PROGRESS NOTES
SUBJECTIVE:   Kira Gomez is a 43 year old female presenting with a chief complaint of   Chief Complaint   Patient presents with     Dizziness     Dizzy and light head last few days and has had chest pain. Issue breathing when this occurs, chest pains becoming more frequent. Has also had a headache.  Tried alive no relief  Today BP was higher than normal, finger tips feels like they are tingling 141/65 bp yesterday earlier 136/90     Does think there is a possibility she was having some anxiety with this.    There is a family history of high blood pressure    She has a personal history of anxiety she has a personal history of anxiety  She did not sleep well last night thinking about this  She is an established patient of [facility].        Review of Systems    Past Medical History:   Diagnosis Date     Family history of thyroid cancer- father at age 67      Migraines      Family History   Problem Relation Age of Onset     Prostate Cancer Maternal Grandfather      Eye Disorder Paternal Grandmother      Thyroid Disease Father      Current Outpatient Prescriptions   Medication Sig Dispense Refill     FIBER PO Take by mouth every other day       Social History   Substance Use Topics     Smoking status: Never Smoker     Smokeless tobacco: Never Used     Alcohol use 0.0 oz/week      Comment: occasional       OBJECTIVE  /82 (BP Location: Right arm, Patient Position: Chair, Cuff Size: Adult Regular)  Pulse 81  Temp 98.8  F (37.1  C) (Oral)  Wt 135 lb 8 oz (61.5 kg)  SpO2 98%  BMI 24 kg/m2    Physical Exam    Labs:  Results for orders placed or performed in visit on 11/14/18 (from the past 24 hour(s))   *UA reflex to Microscopic and Culture ([facility] (except [place] and [place])   Result Value Ref [facility]    Color Urine Yellow     Appearance Urine Clear     Glucose Urine Negative NEG^Negative mg/dL    Bilirubin Urine Negative NEG^Negative    Ketones Urine Negative NEG^Negative mg/dL     Specific Gravity Urine 1.020 1.003 - 1.035    Blood Urine Negative NEG^Negative    pH Urine 7.0 5.0 - 7.0 pH    Protein Albumin Urine Negative NEG^Negative mg/dL    Urobilinogen Urine 0.2 0.2 - 1.0 EU/dL    Nitrite Urine Negative NEG^Negative    Leukocyte Esterase Urine Negative NEG^Negative    Source Midstream Urine        X-Ray was done, my findings are: No abnormal findings    ASSESSMENT:      ICD-10-CM    1. Dizziness R42 Comprehensive metabolic panel     EKG 12-lead complete w/read - Clinics     XR Chest 2 Views     *UA reflex to Microscopic and Culture ([facility] (except [place] and [place])    2.  Elevated blood pressure  3.  Anxiety; self described    Discussed findings today I suspect that this is probably more due to anxiety.    Her story is consistent with she did not sleep very well because she was thinking about this particular problem and wondering about her heart running about pulmonary embolism.  In addition she knows that there is a family history of high blood pressure    .   Her brother is known to have a bicuspid valve she is had an EKG and an echocardiogram that were normal.  Medical Decision Making:    Differential Diagnosis:  Hypertension, musculoskeletal chest pain, pleurisy , pericarditis, esophageal spasm    Serious Comorbid Conditions:  Adult:  None    PLAN:  1. She will use Antivert OTC for the dizziness that is probably middle ear viral infection.      Followup:    I do think she needs to follow-up with her primary care.  There are still some labs that are pending and this can be discussed    However I did not seeming to find any evidence of endorgan damage at this time her chest x-ray looked good her cardiac silhouette looked good her EKG was within normal limits without any evidence that suggested any previous MI or abnormality or ischemia.    Her urine showed no protein  Other labs are pending      There are no Patient Instructions on file for this visit.

## 2019-01-08 ENCOUNTER — TRANSFERRED RECORDS (OUTPATIENT)
Dept: HEALTH INFORMATION MANAGEMENT | Facility: CLINIC | Age: 44
End: 2019-01-08
Payer: COMMERCIAL

## 2019-01-08 LAB
HPV ABSTRACT: NORMAL
PAP-ABSTRACT: NORMAL

## 2019-12-16 ENCOUNTER — HEALTH MAINTENANCE LETTER (OUTPATIENT)
Age: 44
End: 2019-12-16

## 2020-10-05 ENCOUNTER — TRANSFERRED RECORDS (OUTPATIENT)
Dept: HEALTH INFORMATION MANAGEMENT | Facility: CLINIC | Age: 45
End: 2020-10-05
Payer: COMMERCIAL

## 2021-01-13 ENCOUNTER — OFFICE VISIT (OUTPATIENT)
Dept: FAMILY MEDICINE | Facility: CLINIC | Age: 46
End: 2021-01-13
Payer: COMMERCIAL

## 2021-01-13 VITALS
HEART RATE: 111 BPM | DIASTOLIC BLOOD PRESSURE: 65 MMHG | TEMPERATURE: 98.2 F | OXYGEN SATURATION: 100 % | HEIGHT: 63 IN | WEIGHT: 133 LBS | BODY MASS INDEX: 23.57 KG/M2 | SYSTOLIC BLOOD PRESSURE: 122 MMHG

## 2021-01-13 DIAGNOSIS — Z01.818 PREOP GENERAL PHYSICAL EXAM: Primary | ICD-10-CM

## 2021-01-13 DIAGNOSIS — L98.7 LOOSE SKIN: ICD-10-CM

## 2021-01-13 LAB
HCG UR QL: NEGATIVE
HGB BLD-MCNC: 13.4 G/DL (ref 11.7–15.7)

## 2021-01-13 PROCEDURE — 81025 URINE PREGNANCY TEST: CPT | Performed by: NURSE PRACTITIONER

## 2021-01-13 PROCEDURE — 85018 HEMOGLOBIN: CPT | Performed by: NURSE PRACTITIONER

## 2021-01-13 PROCEDURE — 99214 OFFICE O/P EST MOD 30 MIN: CPT | Performed by: NURSE PRACTITIONER

## 2021-01-13 PROCEDURE — 36415 COLL VENOUS BLD VENIPUNCTURE: CPT | Performed by: NURSE PRACTITIONER

## 2021-01-13 ASSESSMENT — MIFFLIN-ST. JEOR: SCORE: 1216.37

## 2021-01-13 NOTE — PROGRESS NOTES
[address] [address]  Phone: [phone]  Primary Provider: Mitzi Velasquez  Pre-op Performing Provider: KECIA LAI    PREOPERATIVE EVALUATION:  Today's date: 1/13/2021    Kira Gomez is a 45 year old female who presents for a preoperative evaluation.    Surgical Information:  Surgery/Procedure: Neck Lift  Surgery Location: [facility]  Surgeon: Dr Ramirez  Surgery Date: 01/25/2020  Time of Surgery: 9:30am  Where patient plans to recover: At home with family  Fax number for surgical facility: [phone]    Type of Anesthesia Anticipated: General    Subjective     HPI related to upcoming procedure: neck lift for loose skin.      Preop Questions 1/13/2021   1. Have you ever had a heart attack or stroke? No   2. Have you ever had surgery on your heart or blood vessels, such as a stent placement, a coronary artery bypass, or surgery on an artery in your head, neck, heart, or legs? No   3. Do you have chest pain with activity? No   4. Do you have a history of  heart failure? No   5. Do you currently have a cold, bronchitis or symptoms of other infection? No   6. Do you have a cough, shortness of breath, or wheezing? No   7. Do you or anyone in your family have previous history of blood clots? No   8. Do you or does anyone in your family have a serious bleeding problem such as prolonged bleeding following surgeries or cuts? No   9. Have you ever had problems with anemia or been told to take iron pills? YES - As a teenager took some iron pills   10. Have you had any abnormal blood loss such as black, tarry or bloody stools, or abnormal vaginal bleeding? No   11. Have you ever had a blood transfusion? No   12. Are you willing to have a blood transfusion if it is medically needed before, during, or after your surgery? Yes   13. Have you or any of your relatives ever had problems with anesthesia? YES - Vomiting  with csection   14. Do you have sleep apnea, excessive snoring or daytime drowsiness? No   15. Do you have any artifical heart valves or other implanted medical devices like a pacemaker, defibrillator, or continuous glucose monitor? No   16. Do you have artificial joints? No   17. Are you allergic to latex? No   18. Is there any chance that you may be pregnant? No     Health Care Directive:  Patient does not have a Health Care Directive or Living Will: Discussed advance care planning with patient; information given to patient to review.    Preoperative Review of : No concerns.       Status of Chronic Conditions:  See problem list for active medical problems.  Problems all longstanding and stable, except as noted/documented.  See ROS for pertinent symptoms related to these conditions. She is on no medications.       Review of Systems  Constitutional, neuro, ENT, endocrine, pulmonary, cardiac, gastrointestinal, genitourinary, musculoskeletal, integument and psychiatric systems are negative, except as otherwise noted.    Patient Active Problem List    Diagnosis Date Noted     Habitual self-excoriation 06/26/2017     Priority: Medium     Ganglion cyst 06/23/2017     Priority: Medium     Injury of left ulnar nerve, unspecified injury location, initial encounter 06/23/2017     Priority: Medium     Encounter for gynecological examination without abnormal finding- [facility] 02/10/2016     Priority: Medium     Family history of thyroid cancer- father at age 67 02/10/2016     Priority: Medium     Family history of bicuspid aortic valve- brother 02/10/2016     Priority: Medium     OCD (obsessive compulsive disorder)- skin picking 02/10/2016     Priority: Medium     Minor depression 12/18/2013     Priority: Medium     Anxiety 12/18/2013     Priority: Medium     Other insomnia- has been off ambien for several years - noted 2/10/2016 12/10/2010     Priority: Medium     CARDIOVASCULAR SCREENING; LDL GOAL LESS THAN 160  "02/10/2010     Priority: Medium      Past Medical History:   Diagnosis Date     Family history of thyroid cancer- father at age 67      Migraines      Past Surgical History:   Procedure Laterality Date     ABDOMEN SURGERY  12/3/2007         C  DELIVERY+POSTPARTUM CARE       No current outpatient medications on file.       No Known Allergies     Social History     Tobacco Use     Smoking status: Never Smoker     Smokeless tobacco: Never Used   Substance Use Topics     Alcohol use: Yes     Alcohol/week: 0.0 standard drinks     Comment: occasional     Family History   Problem Relation Age of Onset     Prostate Cancer Maternal Grandfather      Eye Disorder Paternal Grandmother      Thyroid Disease Father      History   Drug Use No         Objective     /65 (BP Location: Left arm, Patient Position: Chair, Cuff Size: Adult Regular)   Pulse 111   Temp 98.2  F (36.8  C) (Tympanic)   Ht 1.607 m (5' 3.25\")   Wt 60.3 kg (133 lb)   LMP 2021 (Exact Date)   SpO2 100%   Breastfeeding No   BMI 23.37 kg/m      Physical Exam    GENERAL APPEARANCE: healthy, alert and no distress     EYES: EOMI, PERRL     HENT: ear canals and TM's normal and nose and mouth without ulcers or lesions     NECK: no adenopathy, no asymmetry, masses, or scars and thyroid normal to palpation     RESP: lungs clear to auscultation - no rales, rhonchi or wheezes     CV: regular rates and rhythm, normal S1 S2, no S3 or S4 and no murmur, click or rub     ABDOMEN:  soft, nontender, no HSM or masses and bowel sounds normal     MS: extremities normal- no gross deformities noted, no evidence of inflammation in joints, FROM in all extremities.     SKIN: no suspicious lesions or rashes     NEURO: Normal strength and tone, sensory exam grossly normal, mentation intact and speech normal     PSYCH: mentation appears normal. and affect normal/bright     LYMPHATICS: No cervical adenopathy    No results for input(s): HGB, PLT, INR, NA, " POTASSIUM, CR, A1C in the last 77564 hours.     Diagnostics:  Recent Results (from the past 24 hour(s))   HCG Qual, Urine ([id number])    Collection Time: 01/13/21 11:58 AM   Result Value Ref [facility]    HCG Qual Urine Negative NEG^Negative   Hemoglobin    Collection Time: 01/13/21 11:58 AM   Result Value Ref [facility]    Hemoglobin 13.4 11.7 - 15.7 g/dL      Surgeon requests HGB.   No EKG required, no history of coronary heart disease, significant arrhythmia, peripheral arterial disease or other structural heart disease.    Revised Cardiac Risk Index (RCRI):  The patient has the following serious cardiovascular risks for perioperative complications:   - No serious cardiac risks = 0 points     RCRI Interpretation: 0 points: Class I (very low risk - 0.4% complication rate)       Assessment & Plan   The proposed surgical procedure is considered INTERMEDIATE risk.    Kira was seen today for pre-op exam.    Diagnoses and all orders for this visit:    Preop general physical exam  Loose skin  Cleared for surgery.  She takes no medications.   Kira verbalizes understanding of plan of care and is in agreement.   -     HCG Qual, Urine ([id number])  -     Hemoglobin    Risks and Recommendations:  The patient has the following additional risks and recommendations for perioperative complications:   - No identified additional risk factors other than previously addressed    Medication Instructions:  Patient is on no chronic medications   Encourage no NSAIDS, aspirin or vitamin supplementation for the next 7 days.  Tylenol is okay.    RECOMMENDATION:  APPROVAL GIVEN to proceed with proposed procedure, without further diagnostic evaluation.        JUN Arroyo-BC  Signed Electronically by: PIPPA Arroyo CNP    Copy of this evaluation report is provided to requesting physician.    [facility]    Revised Cardiac Risk Index

## 2021-01-13 NOTE — RESULT ENCOUNTER NOTE
Dear Kira,    Here is a summary of your recent test results:    Nice to meet you. Your labs are all normal. Negative pregnancy. Good luck with surgery.     For additional lab test information, labtestsonline.org is an excellent reference.    In addition, here is a list of due or overdue Health Maintenance reminders:    ANNUAL REVIEW OF HM ORDERS due on 1975  Depression Action Plan due on 1975  HIV Screening due on 01/31/1989  Hepatitis C Screening due on 01/31/1992  Mammogram due on 01/29/2017  Depression Assessment due on 12/23/2017  Preventive Care Visit due on 06/23/2018  Flu Vaccine(1) due on 09/01/2020  HPV Screening due on 04/19/2021  PAP Smear due on 04/19/2021    Please call us at [phone] (or use Paymetric) to address the above recommendations if needed.    Thank you for choosing  [facility].  It was an honor and a privilege to participate in your care.       Healthy regards,    Viv Griffin, JUN  [facility]

## 2021-01-15 ENCOUNTER — HEALTH MAINTENANCE LETTER (OUTPATIENT)
Age: 46
End: 2021-01-15

## 2021-07-12 ENCOUNTER — TRANSFERRED RECORDS (OUTPATIENT)
Dept: HEALTH INFORMATION MANAGEMENT | Facility: CLINIC | Age: 46
End: 2021-07-12

## 2021-09-05 ENCOUNTER — HEALTH MAINTENANCE LETTER (OUTPATIENT)
Age: 46
End: 2021-09-05

## 2022-01-11 ENCOUNTER — OFFICE VISIT (OUTPATIENT)
Dept: FAMILY MEDICINE | Facility: CLINIC | Age: 47
End: 2022-01-11
Payer: COMMERCIAL

## 2022-01-11 VITALS
HEIGHT: 63 IN | SYSTOLIC BLOOD PRESSURE: 102 MMHG | BODY MASS INDEX: 24.1 KG/M2 | HEART RATE: 88 BPM | WEIGHT: 136 LBS | TEMPERATURE: 98.1 F | DIASTOLIC BLOOD PRESSURE: 72 MMHG | OXYGEN SATURATION: 99 %

## 2022-01-11 DIAGNOSIS — Z11.4 SCREENING FOR HIV (HUMAN IMMUNODEFICIENCY VIRUS): ICD-10-CM

## 2022-01-11 DIAGNOSIS — R63.5 WEIGHT GAIN: ICD-10-CM

## 2022-01-11 DIAGNOSIS — R53.83 FATIGUE, UNSPECIFIED TYPE: ICD-10-CM

## 2022-01-11 DIAGNOSIS — Z13.220 SCREENING FOR HYPERLIPIDEMIA: ICD-10-CM

## 2022-01-11 DIAGNOSIS — Z86.2 HISTORY OF ANEMIA: ICD-10-CM

## 2022-01-11 DIAGNOSIS — Z11.59 NEED FOR HEPATITIS C SCREENING TEST: ICD-10-CM

## 2022-01-11 DIAGNOSIS — Z00.00 ROUTINE GENERAL MEDICAL EXAMINATION AT A HEALTH CARE FACILITY: Primary | ICD-10-CM

## 2022-01-11 LAB
DEPRECATED CALCIDIOL+CALCIFEROL SERPL-MC: 20 UG/L (ref 20–75)
ERYTHROCYTE [DISTWIDTH] IN BLOOD BY AUTOMATED COUNT: 13.2 % (ref 10–15)
HCT VFR BLD AUTO: 41 % (ref 35–47)
HCV AB SERPL QL IA: NONREACTIVE
HGB BLD-MCNC: 13.6 G/DL (ref 11.7–15.7)
HIV 1+2 AB+HIV1 P24 AG SERPL QL IA: NONREACTIVE
MCH RBC QN AUTO: 26.7 PG (ref 26.5–33)
MCHC RBC AUTO-ENTMCNC: 33.2 G/DL (ref 31.5–36.5)
MCV RBC AUTO: 80 FL (ref 78–100)
PLATELET # BLD AUTO: 273 10E3/UL (ref 150–450)
RBC # BLD AUTO: 5.1 10E6/UL (ref 3.8–5.2)
VIT B12 SERPL-MCNC: 491 PG/ML (ref 193–986)
WBC # BLD AUTO: 4.4 10E3/UL (ref 4–11)

## 2022-01-11 PROCEDURE — 82607 VITAMIN B-12: CPT | Performed by: NURSE PRACTITIONER

## 2022-01-11 PROCEDURE — 99213 OFFICE O/P EST LOW 20 MIN: CPT | Mod: 25 | Performed by: NURSE PRACTITIONER

## 2022-01-11 PROCEDURE — 80053 COMPREHEN METABOLIC PANEL: CPT | Performed by: NURSE PRACTITIONER

## 2022-01-11 PROCEDURE — 90471 IMMUNIZATION ADMIN: CPT | Performed by: NURSE PRACTITIONER

## 2022-01-11 PROCEDURE — 82306 VITAMIN D 25 HYDROXY: CPT | Performed by: NURSE PRACTITIONER

## 2022-01-11 PROCEDURE — 36415 COLL VENOUS BLD VENIPUNCTURE: CPT | Performed by: NURSE PRACTITIONER

## 2022-01-11 PROCEDURE — 80061 LIPID PANEL: CPT | Performed by: NURSE PRACTITIONER

## 2022-01-11 PROCEDURE — 87389 HIV-1 AG W/HIV-1&-2 AB AG IA: CPT | Performed by: NURSE PRACTITIONER

## 2022-01-11 PROCEDURE — 83550 IRON BINDING TEST: CPT | Performed by: NURSE PRACTITIONER

## 2022-01-11 PROCEDURE — 90715 TDAP VACCINE 7 YRS/> IM: CPT | Performed by: NURSE PRACTITIONER

## 2022-01-11 PROCEDURE — 84443 ASSAY THYROID STIM HORMONE: CPT | Performed by: NURSE PRACTITIONER

## 2022-01-11 PROCEDURE — 86803 HEPATITIS C AB TEST: CPT | Performed by: NURSE PRACTITIONER

## 2022-01-11 PROCEDURE — 85027 COMPLETE CBC AUTOMATED: CPT | Performed by: NURSE PRACTITIONER

## 2022-01-11 PROCEDURE — 99396 PREV VISIT EST AGE 40-64: CPT | Mod: 25 | Performed by: NURSE PRACTITIONER

## 2022-01-11 PROCEDURE — 82728 ASSAY OF FERRITIN: CPT | Performed by: NURSE PRACTITIONER

## 2022-01-11 ASSESSMENT — ENCOUNTER SYMPTOMS
HEMATOCHEZIA: 0
DYSURIA: 0
HEADACHES: 0
DIZZINESS: 0
DIARRHEA: 0
SORE THROAT: 0
EYE PAIN: 0
HEARTBURN: 0
ABDOMINAL PAIN: 0
ARTHRALGIAS: 0
SHORTNESS OF BREATH: 0
BREAST MASS: 0
NERVOUS/ANXIOUS: 0
JOINT SWELLING: 0
PARESTHESIAS: 0
WEAKNESS: 0
COUGH: 0
PALPITATIONS: 0
HEMATURIA: 0
CONSTIPATION: 0
NAUSEA: 0
MYALGIAS: 0
FEVER: 0
FREQUENCY: 0
CHILLS: 0

## 2022-01-11 ASSESSMENT — MIFFLIN-ST. JEOR: SCORE: 1224.98

## 2022-01-11 NOTE — PROGRESS NOTES
SUBJECTIVE:   CC: Kira Gomez is an 46 year old woman who presents for preventive health visit.     Patient has been advised of split billing requirements and indicates understanding: Yes     Healthy Habits:     Getting at least 3 servings of Calcium per day:  Yes    Bi-annual eye exam:  Yes    Dental care twice a year:  Yes    Sleep apnea or symptoms of sleep apnea:  None    Diet:  Regular (no restrictions)    Frequency of exercise:  2-3 days/week    Duration of exercise:  15-30 minutes    Taking medications regularly:  Not Applicable    Medication side effects:  None    PHQ-2 Total Score: 0    Additional concerns today:  Yes     Wants to have her thyroid and iron levels checked - experiencing some hair loss and has a family history of thyroid issues    GYN for pap smear and breast exam and mammo.    Wt Readings from Last 5 Encounters:   01/11/22 61.7 kg (136 lb)   01/13/21 60.3 kg (133 lb)   11/14/18 61.5 kg (135 lb 8 oz)   06/27/17 58.5 kg (129 lb)   06/23/17 58.8 kg (129 lb 9.6 oz)     Today's PHQ-2 Score:   PHQ-2 ( 1999 [employer]) 1/11/2022   Q1: Little interest or pleasure in doing things 0   Q2: Feeling down, depressed or hopeless 0   PHQ-2 Score 0   Q1: Little interest or pleasure in doing things Not at all   Q2: Feeling down, depressed or hopeless Not at all   PHQ-2 Score 0     Abuse: Current or Past (Physical, Sexual or Emotional) - No  Do you feel safe in your environment? Yes    Have you ever done Advance Care Planning? (For example, a Health Directive, POLST, or a discussion with a medical provider or your loved ones about your wishes): No, advance care planning information given to patient to review.  Patient declined advance care planning discussion at this time.    Social History     Tobacco Use     Smoking status: Never Smoker     Smokeless tobacco: Never Used   Substance Use Topics     Alcohol use: Yes     Alcohol/week: 0.0 standard drinks     Comment: occasional     If you drink alcohol do you  typically have >3 drinks per day or >7 drinks per week? No    Alcohol Use 2022   Prescreen: >3 drinks/day or >7 drinks/week? No   Prescreen: >3 drinks/day or >7 drinks/week? -       Reviewed orders with patient.  Reviewed health maintenance and updated orders accordingly - Yes  Lab work is in process  Labs reviewed in EPIC  BP Readings from Last 3 Encounters:   22 102/72   21 122/65   18 138/82    Wt Readings from Last 3 Encounters:   22 61.7 kg (136 lb)   21 60.3 kg (133 lb)   18 61.5 kg (135 lb 8 oz)             Patient Active Problem List   Diagnosis     CARDIOVASCULAR SCREENING; LDL GOAL LESS THAN 160     Other insomnia- has been off ambien for several years - noted 2/10/2016     Minor depression     Anxiety     Encounter for gynecological examination without abnormal finding- Ob/gyn [facility]     Family history of thyroid cancer- father at age 67     Family history of bicuspid aortic valve- brother     OCD (obsessive compulsive disorder)- skin picking     Ganglion cyst     Injury of left ulnar nerve, unspecified injury location, initial encounter     Habitual self-excoriation     Past Surgical History:   Procedure Laterality Date     ABDOMEN SURGERY  12/3/2007         ZZC  DELIVERY+POSTPARTUM CARE         Social History     Tobacco Use     Smoking status: Never Smoker     Smokeless tobacco: Never Used   Substance Use Topics     Alcohol use: Yes     Alcohol/week: 0.0 standard drinks     Comment: occasional     Family History   Problem Relation Age of Onset     Colon Polyps Mother      Thyroid Disease Father      Thyroid Cancer Father      Heart Murmur Brother      Dementia Maternal Grandmother      Prostate Cancer Maternal Grandfather      Eye Disorder Paternal Grandmother      Coronary Artery Disease Paternal Grandfather          No current outpatient medications on file.     No Known Allergies    Breast Cancer Screening:    Breast CA Risk Assessment (FHS-7)  "1/11/2022   Do you have a family history of breast, colon, or ovarian cancer? No / Unknown     Mammogram Screening: Recommended annual mammography  Pertinent mammograms are reviewed under the imaging tab.    History of abnormal Pap smear: NO - age 30- 65 PAP every 3 years recommended  PAP / HPV 1/2/2013 10/27/2011 4/28/2010   PAP (Historical) Neg ASC-US(A) NIL     Reviewed and updated as needed this visit by clinical staff  Tobacco  Allergies  Meds  Problems  Med Hx  Surg Hx  Fam Hx  Soc Hx         Reviewed and updated as needed this visit by Provider  Tobacco  Allergies  Meds  Problems  Med Hx  Surg Hx  Fam Hx        Review of Systems   Constitutional: Negative for chills and fever.   HENT: Negative for congestion, ear pain, hearing loss and sore throat.    Eyes: Negative for pain and visual disturbance.   Respiratory: Negative for cough and shortness of breath.    Cardiovascular: Negative for chest pain, palpitations and peripheral edema.   Gastrointestinal: Negative for abdominal pain, constipation, diarrhea, heartburn, hematochezia and nausea.   Breasts:  Negative for tenderness, breast mass and discharge.   Genitourinary: Negative for dysuria, frequency, genital sores, hematuria, pelvic pain, urgency, vaginal bleeding and vaginal discharge.   Musculoskeletal: Negative for arthralgias, joint swelling and myalgias.   Skin: Negative for rash.   Neurological: Negative for dizziness, weakness, headaches and paresthesias.   Psychiatric/Behavioral: Negative for mood changes. The patient is not nervous/anxious.       OBJECTIVE:   /72 (BP Location: Right arm, Cuff Size: Adult Regular)   Pulse 88   Temp 98.1  F (36.7  C) (Tympanic)   Ht 1.607 m (5' 3.25\")   Wt 61.7 kg (136 lb)   SpO2 99%   BMI 23.90 kg/m    Physical Exam  GENERAL: healthy, alert and no distress  EYES: Eyes grossly normal to inspection, PERRL and conjunctivae and sclerae normal  HENT: ear canals and TM's normal, nose and mouth " without ulcers or lesions  NECK: no adenopathy, no asymmetry, masses, or scars and thyroid normal to palpation  RESP: lungs clear to auscultation - no rales, rhonchi or wheezes  BREAST: Declines does with OBGYN  CV: regular rate and rhythm, normal S1 S2, no S3 or S4, no murmur, click or rub, no peripheral edema and peripheral pulses strong  ABDOMEN: soft, nontender, no hepatosplenomegaly, no masses and bowel sounds normal   (female): Declines does with OBGYN  MS: no gross musculoskeletal defects noted, no edema  SKIN: no suspicious lesions or rashes  NEURO: Normal strength and tone, mentation intact and speech normal  PSYCH: mentation appears normal, affect normal/bright    Diagnostic Test Results:  Labs reviewed in Epic    ASSESSMENT/PLAN:   Kira was seen today for physical.    Diagnoses and all orders for this visit:    Routine general medical examination at a health care facility  Well woman exam completed today.    Need records for pap and mammo.  Fasting labs today.    Will notify of lab results.  -     REVIEW OF HEALTH MAINTENANCE PROTOCOL ORDERS    Fatigue, unspecified type  Labs continue to monitor.   -     Vitamin D Deficiency; Future  -     Vitamin B12; Future  -     Vitamin D Deficiency  -     Vitamin B12  -     OFFICE/OUTPT VISIT,EST,LEVL III    Weight gain  Labs continue to monitor.   -     Comprehensive metabolic panel (BMP + Alb, Alk Phos, ALT, AST, Total. Bili, TP); Future  -     TSH with free T4 reflex; Future  -     Comprehensive metabolic panel (BMP + Alb, Alk Phos, ALT, AST, Total. Bili, TP)  -     TSH with free T4 reflex  -     OFFICE/OUTPT VISIT,EST,LEVL III    Screening for HIV (human immunodeficiency virus)  -     HIV Antigen Antibody Combo; Future  -     HIV Antigen Antibody Combo    Need for hepatitis C screening test  -     Hepatitis C Screen Reflex to HCV RNA Quant and Genotype; Future  -     Hepatitis C Screen Reflex to HCV RNA Quant and Genotype    History of anemia  Labs and  "continue to monitor.   -     Ferritin; Future  -     Iron and iron binding capacity; Future  -     CBC with platelets; Future  -     Ferritin  -     Iron and iron binding capacity  -     CBC with platelets  -     **Ferritin FUTURE 6mo; Future  -     Hemoglobin; Future  -     Iron and iron binding capacity; Future  -     OFFICE/OUTPT VISIT,EST,LEVL III    Screening for hyperlipidemia  -     Lipid panel reflex to direct LDL Fasting; Future  -     Lipid panel reflex to direct LDL Fasting    Other orders  -     TDAP VACCINE (Adacel, Boostrix)  [[id number]]        Patient has been advised of split billing requirements and indicates understanding: Yes  COUNSELING:  Reviewed preventive health counseling, as reflected in patient instructions       Regular exercise       Healthy diet/nutrition    Estimated body mass index is 23.9 kg/m  as calculated from the following:    Height as of this encounter: 1.607 m (5' 3.25\").    Weight as of this encounter: 61.7 kg (136 lb).    She reports that she has never smoked. She has never used smokeless tobacco.    Counseling Resources:  ATP IV Guidelines  Pooled Cohorts Equation Calculator  Breast Cancer Risk Calculator  BRCA-Related Cancer Risk Assessment: FHS-7 Tool  FRAX Risk Assessment  ICSI Preventive Guidelines  Dietary Guidelines for Americans, 2010  USDA's MyPlate  ASA Prophylaxis  Lung CA Screening      [facility]  "

## 2022-01-11 NOTE — PATIENT INSTRUCTIONS
Preventive Health Recommendations  Female Ages 40 to 49    Yearly exam:     See your health care provider every year in order to  1. Review health changes.   2. Discuss preventive care.    3. Review your medicines if your doctor prescribed any.      Get a Pap test every three years (unless you have an abnormal result and your provider advises testing more often).      If you get Pap tests with HPV test, you only need to test every 5 years, unless you have an abnormal result. You do not need a Pap test if your uterus was removed (hysterectomy) and you have not had cancer.      You should be tested each year for STDs (sexually transmitted diseases), if you're at risk.     Ask your doctor if you should have a mammogram.      Have a colonoscopy (test for colon cancer) if someone in your family has had colon cancer or polyps before age 50.       Have a cholesterol test every 5 years.       Have a diabetes test (fasting glucose) after age 45. If you are at risk for diabetes, you should have this test every 3 years.    Shots: Get a flu shot each year. Get a tetanus shot every 10 years.     Nutrition:     Eat at least 5 servings of fruits and vegetables each day.    Eat whole-grain bread, whole-wheat pasta and brown rice instead of white grains and rice.    Get adequate Calcium and Vitamin D.      Lifestyle    Exercise at least 150 minutes a week (an average of 30 minutes a day, 5 days a week). This will help you control your weight and prevent disease.    Limit alcohol to one drink per day.    No smoking.     Wear sunscreen to prevent skin cancer.    See your dentist every six months for an exam and cleaning.  Patient Education     For informational purposes only. Not to replace the advice of your health care provider.  Copyright   2006 [employer]. All rights reserved. [employer] 566803 - REV 12/15.  Coping with Hair Loss  What may happen during hair loss?  Radiation and some medicines, like chemotherapy,  "can cause hair loss. You may start to lose your hair two to three weeks after treatment begins.  Your hair may become brittle and break off at the surface of the scalp, or it may simply fall out from the hair follicles. For many people, the head starts to itch or may be tender to the touch as the hair falls out.  Loss of eyebrows, eyelashes, pubic hair and other body hair may also happen, but this is often less severe. Hair growth is less active in these places than in the scalp.  Some people will lose all their hair. Others have only thinning of the hair. Often it depends on the dose and length of your treatment.  Will my hair grow back?  Hair loss caused by medicine will almost always grow back after treatment ends--and sometimes sooner. It may have a different color or texture than before.  Your hair may not grow back if you receive radiation to the head.  What can I do to cope with hair loss?  For some people, hair loss can cause depression or loss of self-confidence. Talk to your loved ones and care team if you are concerned about your hair loss.  Cut your hair short. A shorter style will make your hair look thicker and donahue. And if hair loss occurs, it will be easier to manage.  Use mild shampoo. You may not need to wash your hair every day.  Use a soft hairbrush.  Avoid the hair dryer, or use only the lowest heat setting.  Don't use brush rollers to set your hair.  Don't dye your hair or get a permanent.  Change your linens and pillowcases often. Some people prefer satin.  Cover your head or use sunscreen (SPF 30) when in sunlight.  Cover your head in the winter to prevent heat loss.  If you choose to wear a wig or hairpiece:  You may want to get fit for one before you lose a lot of hair. This way you can match your hair color or style.  Check with your care team to see if there is a \"Look Good, Feel Better\" program in your area. They are a resource for hats, turbans, scarves, hairpieces, wigs and " make-up.  Your hairpiece may be tax deductible, and insurance may cover part of the cost. Check your policy and get a prescription from your doctor.  When should I call my care team?  Call your care team if:  Emotional distress gets in the way of normal daily living.  You have a rash or small, open sores on your scalp.  You have dry, flaky skin that does not improve with the use of lotion. (Choose alcohol-free lotion.)  Comments:  __________________________________________  __________________________________________  __________________________________________  __________________________________________  __________________________________________  __________________________________________  __________________________________________

## 2022-01-12 LAB
ALBUMIN SERPL-MCNC: 4.4 G/DL (ref 3.4–5)
ALP SERPL-CCNC: 70 U/L (ref 40–150)
ALT SERPL W P-5'-P-CCNC: 20 U/L (ref 0–50)
ANION GAP SERPL CALCULATED.3IONS-SCNC: 9 MMOL/L (ref 3–14)
AST SERPL W P-5'-P-CCNC: 17 U/L (ref 0–45)
BILIRUB SERPL-MCNC: 0.4 MG/DL (ref 0.2–1.3)
BUN SERPL-MCNC: 13 MG/DL (ref 7–30)
CALCIUM SERPL-MCNC: 9.3 MG/DL (ref 8.5–10.1)
CHLORIDE BLD-SCNC: 109 MMOL/L (ref 94–109)
CHOLEST SERPL-MCNC: 189 MG/DL
CO2 SERPL-SCNC: 21 MMOL/L (ref 20–32)
CREAT SERPL-MCNC: 0.94 MG/DL (ref 0.52–1.04)
FASTING STATUS PATIENT QL REPORTED: YES
FERRITIN SERPL-MCNC: 8 NG/ML (ref 8–252)
GFR SERPL CREATININE-BSD FRML MDRD: 75 ML/MIN/1.73M2
GLUCOSE BLD-MCNC: 85 MG/DL (ref 70–99)
HDLC SERPL-MCNC: 76 MG/DL
IRON SATN MFR SERPL: 12 % (ref 15–46)
IRON SERPL-MCNC: 54 UG/DL (ref 35–180)
LDLC SERPL CALC-MCNC: 94 MG/DL
NONHDLC SERPL-MCNC: 113 MG/DL
POTASSIUM BLD-SCNC: 4.2 MMOL/L (ref 3.4–5.3)
PROT SERPL-MCNC: 7.7 G/DL (ref 6.8–8.8)
SODIUM SERPL-SCNC: 139 MMOL/L (ref 133–144)
TIBC SERPL-MCNC: 436 UG/DL (ref 240–430)
TRIGL SERPL-MCNC: 94 MG/DL
TSH SERPL DL<=0.005 MIU/L-ACNC: 1.66 MU/L (ref 0.4–4)

## 2022-01-17 NOTE — RESULT ENCOUNTER NOTE
Dear Kira,    Here is a summary of your recent test results:    Overall labs look great.     Vitamin D level is low end normal. I like to see people around 30-50 for best benefit. I would advise getting 1000 International units daily.    You have very minimal elevation of iron binding and low iron sat index; however you have normal iron and low end normal ferritin. I would not want your ferritin to decrease any more. ADVISE: increasing iron in your diet and consider taking iron supplement for 2 months (ferrous gluconate 325 mg twice daily - to avoid constipation from the supplement you should increase fluid intake and fiber in your diet)  Also, recheck your labs in 6 months.    For additional lab test information, labtestsonline.org is an excellent reference.    Please call us at [phone] (or use [employer]) to address the above recommendations if needed.    Thank you for choosing [facility].  It was an honor and a privilege to participate in your care.     Healthy regards,    Viv Griffin, JUN  [facility]

## 2022-02-07 ENCOUNTER — TRANSFERRED RECORDS (OUTPATIENT)
Dept: HEALTH INFORMATION MANAGEMENT | Facility: CLINIC | Age: 47
End: 2022-02-07
Payer: COMMERCIAL

## 2022-04-29 ENCOUNTER — TRANSFERRED RECORDS (OUTPATIENT)
Dept: HEALTH INFORMATION MANAGEMENT | Facility: CLINIC | Age: 47
End: 2022-04-29
Payer: COMMERCIAL

## 2022-06-16 ENCOUNTER — TRANSFERRED RECORDS (OUTPATIENT)
Dept: HEALTH INFORMATION MANAGEMENT | Facility: CLINIC | Age: 47
End: 2022-06-16

## 2022-06-16 PROCEDURE — 88305 TISSUE EXAM BY PATHOLOGIST: CPT | Mod: TC,ORL | Performed by: INTERNAL MEDICINE

## 2022-06-16 PROCEDURE — 88305 TISSUE EXAM BY PATHOLOGIST: CPT | Mod: 26

## 2022-06-17 ENCOUNTER — LAB REQUISITION (OUTPATIENT)
Dept: LAB | Facility: CLINIC | Age: 47
End: 2022-06-17
Payer: COMMERCIAL

## 2022-06-17 DIAGNOSIS — K29.70 GASTRITIS, UNSPECIFIED, WITHOUT BLEEDING: ICD-10-CM

## 2022-06-17 DIAGNOSIS — R05.3 CHRONIC COUGH: ICD-10-CM

## 2022-06-17 DIAGNOSIS — K26.9 DUODENAL ULCER, UNSPECIFIED AS ACUTE OR CHRONIC, WITHOUT HEMORRHAGE OR PERFORATION: ICD-10-CM

## 2022-06-17 DIAGNOSIS — R12 HEARTBURN: ICD-10-CM

## 2022-06-22 LAB
PATH REPORT.COMMENTS IMP SPEC: NORMAL
PATH REPORT.FINAL DX SPEC: NORMAL
PATH REPORT.GROSS SPEC: NORMAL
PATH REPORT.MICROSCOPIC SPEC OTHER STN: NORMAL
PATH REPORT.RELEVANT HX SPEC: NORMAL
PHOTO IMAGE: NORMAL

## 2022-08-12 ENCOUNTER — TRANSFERRED RECORDS (OUTPATIENT)
Dept: HEALTH INFORMATION MANAGEMENT | Facility: CLINIC | Age: 47
End: 2022-08-12

## 2022-08-29 ENCOUNTER — TRANSFERRED RECORDS (OUTPATIENT)
Dept: HEALTH INFORMATION MANAGEMENT | Facility: CLINIC | Age: 47
End: 2022-08-29

## 2022-10-23 ENCOUNTER — HEALTH MAINTENANCE LETTER (OUTPATIENT)
Age: 47
End: 2022-10-23

## 2022-12-10 ENCOUNTER — HEALTH MAINTENANCE LETTER (OUTPATIENT)
Age: 47
End: 2022-12-10

## 2023-03-27 ENCOUNTER — TELEPHONE (OUTPATIENT)
Dept: OPHTHALMOLOGY | Facility: CLINIC | Age: 48
End: 2023-03-27

## 2023-03-27 ENCOUNTER — OFFICE VISIT (OUTPATIENT)
Dept: URGENT CARE | Facility: URGENT CARE | Age: 48
End: 2023-03-27
Payer: COMMERCIAL

## 2023-03-27 VITALS
DIASTOLIC BLOOD PRESSURE: 78 MMHG | SYSTOLIC BLOOD PRESSURE: 118 MMHG | BODY MASS INDEX: 23.9 KG/M2 | TEMPERATURE: 99.5 F | OXYGEN SATURATION: 98 % | HEART RATE: 80 BPM | WEIGHT: 136 LBS

## 2023-03-27 DIAGNOSIS — H00.015 HORDEOLUM EXTERNUM LEFT LOWER EYELID: Primary | ICD-10-CM

## 2023-03-27 PROCEDURE — 99212 OFFICE O/P EST SF 10 MIN: CPT | Performed by: PHYSICIAN ASSISTANT

## 2023-03-27 RX ORDER — ERYTHROMYCIN 5 MG/G
OINTMENT OPHTHALMIC
COMMUNITY
Start: 2023-03-22

## 2023-03-27 RX ORDER — CEPHALEXIN 500 MG/1
CAPSULE ORAL
COMMUNITY
Start: 2023-03-27

## 2023-03-27 NOTE — TELEPHONE ENCOUNTER
[facility] Call Center    Phone Message    May a detailed message be left on voicemail: yes     Reason for Call: Appointment Intake    Referring Provider Name: Charu Hurst PA-C in  URGENT CARE  Diagnosis and/or Symptoms: Hordeolum externum left lower eyelid [H00.015]  Priority: 1-2 Weeks  Patient going out of Town Thursday so Thursday didn't work, can we get patient in next couple days?    Thank you,    Action Taken: Message routed to:  [facility] ([facility]): eye    Travel Screening: Not Applicable

## 2023-03-27 NOTE — PROGRESS NOTES
URGENT CARE VISIT:    SUBJECTIVE:   Kira Gomez is a 48 year old female who presents complaining of mild left eye redness, eyelid swelling for 1.5 week(s).  Onset/timing was gradual. Associated signs and symptoms include none. She denies vision changes, headache, sore throat, dry cough, fever, chills and sinus and nasal congestion. She has tried Cephalexin and topical antibiotics with no relief of symptoms.  Patient does not wear contacts. Recent sick contacts include none.     PMH:   Past Medical History:   Diagnosis Date     Family history of thyroid cancer- father at age 67      Migraines      Allergies: Patient has no known allergies.  Medications:   Current Outpatient Medications   Medication Sig Dispense Refill     cephALEXin (KEFLEX) 500 MG capsule        erythromycin (ROMYCIN) 5 MG/GM ophthalmic ointment APPLY THIN LAYER TO LEFT EYELID 1 TO 2 TIMES DAILY AS DIRECTED       Social History:   Social History     Socioeconomic History     Marital status:      Spouse name: Not on file     Number of children: Not on file     Years of education: Not on file     Highest education level: Not on file   Occupational History     Not on file   Tobacco Use     Smoking status: Never     Smokeless tobacco: Never   Vaping Use     Vaping Use: Never used   Substance and Sexual Activity     Alcohol use: Yes     Alcohol/week: 0.0 standard drinks     Comment: occasional     Drug use: No     Sexual activity: Yes     Partners: Male     Birth control/protection: Condom     Comment:  thinking about vasectomy    Other Topics Concern     Parent/sibling w/ CABG, MI or angioplasty before 65F 55M? No   Social History Narrative     Not on file     Social Determinants of Health     Financial Resource Strain: Not on file   Food Insecurity: Not on file   Transportation Needs: Not on file   Physical Activity: Not on file   Stress: Not on file   Social Connections: Not on file   Intimate Partner Violence: Not on file   Housing  Stability: Not on file       ROS: ROS otherwise found to be negative except as noted above.    OBJECTIVE:  /78 (BP Location: Right arm, Patient Position: Chair, Cuff Size: Adult Regular)   Pulse 80   Temp 99.5  F (37.5  C) (Oral)   Wt 61.7 kg (136 lb)   LMP 03/24/2023 (Approximate)   SpO2 98%   BMI 23.90 kg/m    General: WDWN in NAD.   Head: normocephalic, atraumatic   Eyes: no conjunctival injection. Left medial lower eyelid has 1 cm erythematous nodule. No drainage noted  Cardiac: RRR without murmurs, rubs, or gallops.  Respiratory: LCTAB without adventitious sounds. Non-labored breathing.  Lymph nodes: no cervical adenopathy.  Skin: no rashes or lesions      ASSESSMENT:     ICD-10-CM    1. Hordeolum externum left lower eyelid  H00.015 Adult Eye  Referral           PLAN:  Patient Instructions   Patient was educated on the natural course of self-resolving condition. She finished Keflex and topical antibiotic with worsening symptoms. Patient wanted I & D which she we do not do in urgent care. She has had hordeolum for 1.5 weeks which is likely still not a candidate for I & D at this time. Conservative measures discussed including warm moist compresses twice daily for 15 minutes. See your primary care provider if symptoms worsen or do not improve in 7 days. Seek emergency care if you develop fever, severe eye pain, or increased redness. Patient verbalized understanding and is agreeable to plan.  Patient verbalized understanding and is agreeable to plan. The patient was discharged ambulatory and in stable condition.    Charu Hurst PA-C on 3/27/2023 at 1:52 PM

## 2023-03-27 NOTE — PATIENT INSTRUCTIONS
Patient was educated on the natural course of self-resolving condition. A stye may develop after a small gland in your eyelid gets plugged. Conservative measures discussed including warm moist compresses twice daily for 15 minutes. See your primary care provider if symptoms worsen or do not improve in 7 days. Seek emergency care if you develop fever, severe eye pain, or increased redness. Patient verbalized understanding and is agreeable to plan.

## 2023-03-27 NOTE — TELEPHONE ENCOUNTER
I spoke to the patient who has LLL johnion for one week.  She has been on oral antibiotic and eyedrops.  She doesn't see improvement with treatment.  Appointment scheduled.

## 2023-04-20 ENCOUNTER — PATIENT OUTREACH (OUTPATIENT)
Dept: CARE COORDINATION | Facility: CLINIC | Age: 48
End: 2023-04-20
Payer: COMMERCIAL

## 2023-05-04 ENCOUNTER — PATIENT OUTREACH (OUTPATIENT)
Dept: CARE COORDINATION | Facility: CLINIC | Age: 48
End: 2023-05-04
Payer: COMMERCIAL

## 2023-08-27 ENCOUNTER — HEALTH MAINTENANCE LETTER (OUTPATIENT)
Age: 48
End: 2023-08-27

## 2024-01-14 ENCOUNTER — HEALTH MAINTENANCE LETTER (OUTPATIENT)
Age: 49
End: 2024-01-14

## 2024-04-05 NOTE — NURSING NOTE
"Chief Complaint   Patient presents with     Musculoskeletal Problem       Initial /78  Ht 5' 3\" (1.6 m)  Wt 129 lb (58.5 kg)  LMP 06/19/2017 (Exact Date)  BMI 22.85 kg/m2 Estimated body mass index is 22.85 kg/(m^2) as calculated from the following:    Height as of this encounter: 5' 3\" (1.6 m).    Weight as of this encounter: 129 lb (58.5 kg).  Medication Reconciliation: complete     Geraldo Osborne ATC    " Pt came c/o left eye and left facial droop since yesterday morning.

## 2024-10-20 ENCOUNTER — HEALTH MAINTENANCE LETTER (OUTPATIENT)
Age: 49
End: 2024-10-20

## 2025-01-26 ENCOUNTER — HEALTH MAINTENANCE LETTER (OUTPATIENT)
Age: 50
End: 2025-01-26